# Patient Record
Sex: FEMALE
[De-identification: names, ages, dates, MRNs, and addresses within clinical notes are randomized per-mention and may not be internally consistent; named-entity substitution may affect disease eponyms.]

---

## 2018-12-30 ENCOUNTER — HOSPITAL ENCOUNTER (EMERGENCY)
Dept: HOSPITAL 42 - ED | Age: 43
Discharge: HOME | End: 2018-12-30
Payer: SELF-PAY

## 2018-12-30 VITALS — OXYGEN SATURATION: 98 % | DIASTOLIC BLOOD PRESSURE: 38 MMHG | SYSTOLIC BLOOD PRESSURE: 151 MMHG | HEART RATE: 80 BPM

## 2018-12-30 VITALS — RESPIRATION RATE: 18 BRPM | TEMPERATURE: 97.9 F

## 2018-12-30 VITALS — BODY MASS INDEX: 26.2 KG/M2

## 2018-12-30 DIAGNOSIS — M43.6: Primary | ICD-10-CM

## 2018-12-30 PROCEDURE — 99283 EMERGENCY DEPT VISIT LOW MDM: CPT

## 2018-12-30 PROCEDURE — 96372 THER/PROPH/DIAG INJ SC/IM: CPT

## 2018-12-30 NOTE — ED PDOC
Arrival/HPI





- General


Time Seen by Provider: 12/30/18 13:24


Historian: Patient





- History of Present Illness


Narrative History of Present Illness (Text): 


12/30/18 14:05


A 43 year old female, whose past medical history includes anemia, presents to 

the emergency department complaining of bilateral neck pain for the past 4 days.

Patient reports when turning head and moving it side-to-side, pain worsens. Took

Advil however had no relief of symptom. Patient denies any fever, chills, neck 

stiffness, injuries, visual changes, headache, nausea, vomiting, weakness, 

paresthesia, or any other complaints at this time. 





No PMD








Past Medical History





- Provider Review


Nursing Documentation Reviewed: Yes





- Infectious Disease


Hx of Infectious Diseases: None





- Past Medical History


Past Medical History: No Previous





- Hematological/Oncological


Hx Anemia: Yes





- Psychiatric


Hx Depression: No


Hx Substance Use: No





- Past Surgical History


Past Surgical History: No Previous





- Suicidal Assessment


Feels Threatened In Home Enviroment: No





Family/Social History





- Physician Review


Nursing Documentation Reviewed: Yes


Family/Social History: No Known Family HX


Hx Alcohol Use: No


Hx Substance Use: No





Allergies/Home Meds


Allergies/Adverse Reactions: 


Allergies





No Known Allergies Allergy (Verified 06/17/14 15:56)


   








Home Medications: 


                                    Home Meds











 Medication  Instructions  Recorded  Confirmed


 


RX: Multivitamin and [id number] 1 tab PO DAILY 05/18/14 09/24/16





[Prenatal]   














Review of Systems





- Physician Review


All systems were reviewed & negative as marked: Yes





- Review of Systems


Constitutional: absent: Fevers, Night Sweats


Eyes: absent: Vision Changes


Respiratory: absent: SOB, Cough


Cardiovascular: absent: Chest Pain


Gastrointestinal: absent: Abdominal Pain, Nausea, Vomiting


Musculoskeletal: Neck Pain (bilateral)


Neurological: absent: Headache, Dizziness, Other (no weakness)





Physical Exam





- Physical Exam


Narrative Physical Exam (Text): 





Gen: NAD, cooperative, well appearing, non-toxic.


Head: NCAT.


HEENT: 


EYES: PERRL, EOMI, conjunctiva clear, 


MOUTH: moist MM, posterior pharynx without erythema or exudate, uvula midline.


NECK: supple, FROM, no c spine tenderness, no meningismus


CV: (+) S1S2, RRR, no M/G/R


LUNGS: CTA B/L, No W/R/R, good air movement


Abd: Soft, NTTP, no guarding, rebound or rigidity.


Neuro: AAO x 3, GCS 15, CN 2-12 intact, motor and sensory grossly intact, 5/5 

muscle strength B/L UE's and LE's.


ext: no cyanosis or edema


Back: +bilateral trapezius tenderness











Medical Decision Making


ED Course and Treatment: 


12/30/18 14:10


Impression: 43 year old female with bilateral neck pain.





Plan:


-- POC Urine Pregnancy Test


-- Valium


-- Toradol


-- Reassess and disposition





Progress Notes:


12/30/18 15:24


Upon reassessment, patient is feeling a little better however still experiencing

 some chest pain. To be given Perocet to relieve pain.





12/30/18 16:47


Upon second reassessment, patient feels better, still pain movement neck 

left-to-right. Patient to be sent home with muscle relaxer and pain medication, 

and will be using warm compresses.








- Medication Orders


Current Medication Orders: 











Diazepam (Valium)  5 mg PO ONCE ONE; Protocol


   Stop: 12/30/18 13:47


Ketorolac Tromethamine (Toradol)  60 mg IM STAT STA


   Stop: 12/30/18 13:47











- Scribe Statement


The provider has reviewed the documentation as recorded by the Mert Newton





Provider Scribe Attestation:


All medical record entries made by the Scribe were at my direction and 

personally dictated by me. I have reviewed the chart and agree that the record 

accurately reflects my personal performance of the history, physical exam, 

medical decision making, and the department course for this patient. I have also

 personally directed, reviewed, and agree with the discharge instructions and 

disposition.








Disposition/Present on Arrival





- Present on Arrival


Any Indicators Present on Arrival: No


History of DVT/PE: No


History of Uncontrolled Diabetes: No


Urinary Catheter: No


History Surgical Site Infection Following: None





- Disposition


Have Diagnosis and Disposition been Completed?: Yes


Diagnosis: 


 Torticollis





Disposition: HOME/ ROUTINE


Disposition Time: 16:55


Patient Plan: Discharge


Condition: STABLE


Discharge Instructions (ExitCare):  Danielle (DC)


Print Language: Cuban


Prescriptions: 


diaZEpam [Valium] 5 mg PO TID PRN #15 tab


 PRN Reason: Muscle Spasm


RX: Ibuprofen [Motrin Tab] 800 mg PO TID PRN #30 tab


 PRN Reason: Pain, Moderate (4-7)


Referrals: 


Nadine,Jasmyn, MD [Medical Doctor] - Follow up with primary


[facility] [Outside] - Follow up with primary


[facility] [place]] - Follow up with primary


Forms:  [employer] (Dominican)